# Patient Record
(demographics unavailable — no encounter records)

---

## 2024-11-20 NOTE — REVIEW OF SYSTEMS
[Recent Weight Gain (___ Lbs)] : recent weight gain: [unfilled] lbs [Palpitations] : palpitations [Dry Skin] : dry skin [Hair Loss] : hair loss [Fatigue] : no fatigue [Decreased Appetite] : appetite not decreased [Visual Field Defect] : no visual field defect [Blurred Vision] : no blurred vision [Dysphagia] : no dysphagia [Neck Pain] : no neck pain [Dysphonia] : no dysphonia [Chest Pain] : no chest pain [Constipation] : no constipation [Diarrhea] : no diarrhea [Headaches] : no headaches [Tremors] : no tremors [Depression] : no depression [Anxiety] : no anxiety [Cold Intolerance] : no cold intolerance [Heat Intolerance] : no heat intolerance [FreeTextEntry5] : once in awhile  [de-identified] : improving

## 2024-11-20 NOTE — PHYSICAL EXAM
[Alert] : alert [Well Nourished] : well nourished [No Acute Distress] : no acute distress [Well Developed] : well developed [Normal Sclera/Conjunctiva] : normal sclera/conjunctiva [No Proptosis] : no proptosis [No LAD] : no lymphadenopathy [No Thyroid Nodules] : no palpable thyroid nodules [No Respiratory Distress] : no respiratory distress [No Accessory Muscle Use] : no accessory muscle use [Normal Rate and Effort] : normal respiratory rate and effort [Clear to Auscultation] : lungs were clear to auscultation bilaterally [Normal S1, S2] : normal S1 and S2 [Normal Rate] : heart rate was normal [Regular Rhythm] : with a regular rhythm [No Rash] : no rash [Acanthosis Nigricans] : no acanthosis nigricans [No Tremors] : no tremors [Oriented x3] : oriented to person, place, and time [Normal Affect] : the affect was normal [Normal Insight/Judgement] : insight and judgment were intact [Normal Mood] : the mood was normal [de-identified] : enlarged thyroid

## 2024-11-20 NOTE — ASSESSMENT
[Levothyroxine] : The patient was instructed to take Levothyroxine on an empty stomach, separate from vitamins, and wait at least 30 minutes before eating [FreeTextEntry1] : 24 y/o Male with Hx of Graves' disease s/p EDWARDS in December 2023, has not been on MMI since EDWARDS. He now has Hashimoto's, labs will be done in office today, continue current dose of Levothyroxine for now.   RTO in 3 months with Dr. Dominguez

## 2024-11-20 NOTE — HISTORY OF PRESENT ILLNESS
[FreeTextEntry1] :     23M with no PMHX presented to [facility] then transferred to [facility] for unintentional weight loss (25lb), muscle aches, dysphagia, abdominal pain, N/V/D, and palpitations for past 3 months admitted for new onset Symptomatic Hyperthyroidism c/b Acute myopathy. Patient was started on methimazole and then transitioned to propranolol, prednisone, PTU and iodine drops. T4 started to improve. Symptoms began to improve. Then discharged from [facility] on PTU and Propranolol. Patient was seen at [facility] by Dr. Lamb  History: Last office visit presented with 3 weeks ago with muscle pain, unable to walk in morning, hard breath at night, dysphagia, bone pain, palpitations and chest pain. He stopped MMI on Friday due to scheduled for uptake and scan, he has been taking Propranolol daily. Advised pt. to go to the hospital at the time however never went hospital and have EDWARDS in Dec. of 2023.  He has been seen in office since October 2023 due to no insurance.  Patient seen eye doctor 2 days ago - reports healthy eyes, has dry eyes, needs eye drops Patient was started on Levothyroxine 2 months, and he is feeling better, once in a while will have palpitations.    Quality: Hyperthyroidism s/p EDWARDS Severity: moderate Duration: April 2023 Onset: abdominal pain, palpitations Modifying Factors: Somewhat better with medication Associated Symptoms: sometimes palpitations, none tremors    Current Regimen: Levothyroxine 50 mcg daily - denies missing any doses   Methimazole 10 mg 2 tabs daily - stopped due to EDWARDS Propranolol ER 60 mg twice a day - stopped   Date of last thyroid sonogram: 4/20/23 at [facility] thyroid sonogram revealed enlarged, slightly heterogeneous hyper-vascular thyroid gland compatible with thyroiditis

## 2025-02-13 NOTE — REVIEW OF SYSTEMS
[Palpitations] : palpitations [Heartburn] : heartburn [Headaches] : headaches [Heat Intolerance] : heat intolerance [Fatigue] : no fatigue [Decreased Appetite] : appetite not decreased [Recent Weight Gain (___ Lbs)] : no recent weight gain [Recent Weight Loss (___ Lbs)] : no recent weight loss [Visual Field Defect] : no visual field defect [Blurred Vision] : no blurred vision [Dysphagia] : no dysphagia [Neck Pain] : no neck pain [Dysphonia] : no dysphonia [Chest Pain] : no chest pain [Constipation] : no constipation [Diarrhea] : no diarrhea [Dry Skin] : no dry skin [Hair Loss] : no hair loss [Tremors] : no tremors [Depression] : no depression [Anxiety] : no anxiety [FreeTextEntry2] : weight stable [de-identified] : random headaches

## 2025-02-13 NOTE — PHYSICAL EXAM
[Alert] : alert [Well Nourished] : well nourished [No Acute Distress] : no acute distress [Well Developed] : well developed [Normal Sclera/Conjunctiva] : normal sclera/conjunctiva [No Proptosis] : no proptosis [No LAD] : no lymphadenopathy [Supple] : the neck was supple [No Thyroid Nodules] : no palpable thyroid nodules [No Respiratory Distress] : no respiratory distress [No Accessory Muscle Use] : no accessory muscle use [Normal Rate and Effort] : normal respiratory rate and effort [Clear to Auscultation] : lungs were clear to auscultation bilaterally [Normal S1, S2] : normal S1 and S2 [Normal Rate] : heart rate was normal [Regular Rhythm] : with a regular rhythm [Normal Bowel Sounds] : normal bowel sounds [Not Tender] : non-tender [Soft] : abdomen soft [No Rash] : no rash [Acanthosis Nigricans] : no acanthosis nigricans [No Tremors] : no tremors [Oriented x3] : oriented to person, place, and time [Normal Affect] : the affect was normal [Normal Insight/Judgement] : insight and judgment were intact [Normal Mood] : the mood was normal [de-identified] : enlarged thyroid

## 2025-02-13 NOTE — HISTORY OF PRESENT ILLNESS
[FreeTextEntry1] : 23M with no PMHX presented to [facility] then transferred to [facility] for unintentional weight loss (25lb), muscle aches, dysphagia, abdominal pain, N/V/D, and palpitations for past 3 months admitted for new onset Symptomatic Hyperthyroidism c/b Acute myopathy. Patient was started on methimazole and then transitioned to propranolol, prednisone, PTU and iodine drops. T4 started to improve. Symptoms began to improve. Then discharged from [facility] on PTU and Propranolol. Patient was seen at [facility] by Dr. Lamb  History: Last office visit presented with 3 weeks ago with muscle pain, unable to walk in morning, hard breath at night, dysphagia, bone pain, palpitations and chest pain. He stopped MMI on Friday due to scheduled for uptake and scan, he has been taking Propranolol daily. Advised pt. to go to the hospital at the time however never went hospital and have EDWARDS in Dec. of 2023.  He has been seen in office since October 2023 due to no insurance.  Patient reports more palpitations started 2 weeks ago  C/o nasal congestion after EDWARDS    Quality: Hyperthyroidism s/p EDWARDS Severity: moderate Duration: April 2023 Onset: abdominal pain, palpitations Modifying Factors: Somewhat better with medication Associated Symptoms: daily palpitations, no tremors, headaches randomly     Current Regimen: Levothyroxine 88 mcg daily - denies missing any doses and reports taking medication on an empty stomach   Methimazole 10 mg 2 tabs daily - stopped due to EDWARDS Propranolol ER 60 mg twice a day - stopped   Date of last thyroid sonogram: 4/20/23 at [facility] thyroid sonogram revealed enlarged, slightly heterogeneous hyper-vascular thyroid gland compatible with thyroiditis  Seen eye doctor - has dry and using eye drops

## 2025-02-13 NOTE — ASSESSMENT
[Levothyroxine] : The patient was instructed to take Levothyroxine on an empty stomach, separate from vitamins, and wait at least 30 minutes before eating [FreeTextEntry1] : 24 y/o Male with Hx of Graves' disease s/p EDWARDS in December 2023, has not been on MMI since EDWARDS. He now has Hashimoto's, over the last 2 weeks he reported daily palpitations and heartburn after eating, TSH mildly elevated - increase Levothyroxine to 100 mcg daily along with repeating labs in 4 weeks. Recommend to follow up with Cardiology as thyroid levels are not indicative of palpitations and follow up with gastro due to heartburn.  RTO in 1 month with NP RTO in 2 months with Dr. Dominguez.